# Patient Record
Sex: MALE | Race: WHITE | NOT HISPANIC OR LATINO | URBAN - METROPOLITAN AREA
[De-identification: names, ages, dates, MRNs, and addresses within clinical notes are randomized per-mention and may not be internally consistent; named-entity substitution may affect disease eponyms.]

---
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
## 2024-04-05 ENCOUNTER — OFFICE VISIT (OUTPATIENT)
Age: 19
End: 2024-04-05
Payer: COMMERCIAL

## 2024-04-05 VITALS
OXYGEN SATURATION: 97 % | DIASTOLIC BLOOD PRESSURE: 68 MMHG | SYSTOLIC BLOOD PRESSURE: 118 MMHG | WEIGHT: 137.57 LBS | TEMPERATURE: 97.1 F | HEART RATE: 94 BPM | BODY MASS INDEX: 19.69 KG/M2 | HEIGHT: 70 IN | RESPIRATION RATE: 16 BRPM

## 2024-04-05 DIAGNOSIS — Z20.2 EXPOSURE TO CHLAMYDIA: Primary | ICD-10-CM

## 2024-04-05 PROCEDURE — 87591 N.GONORRHOEAE DNA AMP PROB: CPT | Performed by: EMERGENCY MEDICINE

## 2024-04-05 PROCEDURE — 87491 CHLMYD TRACH DNA AMP PROBE: CPT | Performed by: EMERGENCY MEDICINE

## 2024-04-05 PROCEDURE — 99203 OFFICE O/P NEW LOW 30 MIN: CPT | Performed by: EMERGENCY MEDICINE

## 2024-04-05 NOTE — PROGRESS NOTES
[facility]        NAME: Lebron Deluca is a 18 y.o. male  : 2005    MRN: [id number]  DATE: 2024  TIME: 1:00 PM    Assessment and Plan   Exposure to chlamydia [Z20.2]  1. Exposure to chlamydia  Chlamydia/GC amplified DNA by PCR            Patient Instructions     Patient Instructions   Chlamydia   WHAT YOU NEED TO KNOW:   Chlamydia is a sexually transmitted infection (STI) caused by bacteria. Chlamydia is spread during oral, vaginal, or anal sex. The infection most often affects the urethra, rectum, or throat. The urethra is the tube that carries urine from your bladder to the outside of your body. Anyone with multiple sex partners is at higher risk for chlamydia. Your risk is also increased if you have another STI, such as gonorrhea.  DISCHARGE INSTRUCTIONS:   Call your doctor if:   You have a fever.    You have nausea or you cannot stop vomiting.    You have severe abdominal pain.    Your signs or symptoms last longer than 1 week or get worse during treatment.    Your signs or symptoms return after treatment.    You have pain during sex.    You have questions or concerns about your condition or care.    Medicines:   Antibiotics  help treat the infection caused by bacteria. Both you and your sex partner need treatment to prevent chlamydia from spreading.    Take your medicine as directed.  Contact your healthcare provider if you think your medicine is not helping or if you have side effects. Tell your provider if you are allergic to any medicine. Keep a list of the medicines, vitamins, and herbs you take. Include the amounts, and when and why you take them. Bring the list or the pill bottles to follow-up visits. Carry your medicine list with you in case of an emergency.    How can I prevent the spread of chlamydia and other STIs?  Ask your healthcare provider for more information about the following safe sex practices:  Use a male or female condom during sex.  This includes oral, genital, or  anal sex. Use a new condom each time. Condoms help prevent pregnancy and STIs. Use latex condoms, if possible. Lambskin (also called sheepskin or natural membrane) condoms do not protect against STIs. A polyurethane condom can be used if you or your partner is allergic to latex. Condoms should be used with a second form of birth control to help prevent pregnancy and STIs. Do not use male and female condoms together. Ask for more information about the correct way to use condoms.    Limit your number of sex partners.  This will help lower your risk for chlamydia and other STIs.    Get tested for STIs regularly  if you are sexually active. You should get tested 1 time a year, or after new sexual partners. Get tested if you have sex without a condom. This includes oral, genital, or anal sex.    Do not have sex with someone who has an STI.  This includes oral, vaginal, and anal sex.    Do not have sex while you or your partner are being treated.  Ask when it is safe to have sex.    Ask about medicines to lower your risk for some STIs:      Vaccines  can help protect you from hepatitis A, hepatitis B, and the human papillomavirus (HPV). The HPV vaccine is usually given at 11 years, but it may be given through 26 years to both females and males. Your provider can give you more information on vaccines to prevent STIs.    Pre-exposure prophylaxis (PrEP)  may be given if you are at high risk for HIV. PrEP is taken every day to prevent the virus from fully infecting the body.    If you are a woman:      Do not douche.  Douching upsets the normal balance of bacteria found in your vagina. It does not prevent or clear up vaginal infections.    Tell your healthcare provider if you are pregnant.  Gonorrhea can be passed to an infant during birth.    Follow up with your doctor as directed:  Write down your questions so you remember to ask them during your visits.  © Copyright [employer] 2023 Information is for End User's use only and  "may not be sold, redistributed or otherwise used for commercial purposes.  The above information is an  only. It is not intended as medical advice for individual conditions or treatments. Talk to your doctor, nurse or pharmacist before following any medical regimen to see if it is safe and effective for you.      Follow up with PCP in 3-5 days.  Proceed to  ER if symptoms worsen.    Chief Complaint     Chief Complaint   Patient presents with    Exposure to STD     Exposure to chlamydia. about one week ago. Denies any STD symptoms.          History of Present Illness       Patient requests Chlamydia testing after exposure during protected sex to someone just diagnosed with chlamydia.  Patient denies any urethral symptoms.        Review of Systems   Review of Systems   Constitutional:  Negative for chills and fever.   Genitourinary:  Negative for difficulty urinating, dysuria, flank pain, hematuria, penile discharge, scrotal swelling and testicular pain.   Musculoskeletal:  Negative for back pain.   Skin:  Negative for rash.         Current Medications     No current outpatient medications on file.    Current Allergies     Allergies as of 04/05/2024    (No Known Allergies)            The following portions of the patient's history were reviewed and updated as appropriate: allergies, current medications, past family history, past medical history, past social history, past surgical history and problem list.     Past Medical History:   Diagnosis Date    Migraines        History reviewed. No pertinent surgical history.    Family History   Problem Relation Age of Onset    No Known Problems Mother     No Known Problems Father          Medications have been verified.        Objective   /68   Pulse 94   Temp (!) 97.1 °F (36.2 °C)   Resp 16   Ht 5' 10\" (1.778 m)   Wt 62.4 kg (137 lb 9.1 oz)   SpO2 97%   BMI 19.74 kg/m²        Physical Exam     Physical Exam  Vitals and nursing note reviewed. "   Constitutional:       General: He is not in acute distress.     Appearance: He is well-developed. He is not diaphoretic.   Skin:     General: Skin is warm and dry.      Coloration: Skin is not pale.   Neurological:      Mental Status: He is alert and oriented to person, place, and time.

## 2024-04-05 NOTE — PATIENT INSTRUCTIONS
Chlamydia   WHAT YOU NEED TO KNOW:   Chlamydia is a sexually transmitted infection (STI) caused by bacteria. Chlamydia is spread during oral, vaginal, or anal sex. The infection most often affects the urethra, rectum, or throat. The urethra is the tube that carries urine from your bladder to the outside of your body. Anyone with multiple sex partners is at higher risk for chlamydia. Your risk is also increased if you have another STI, such as gonorrhea.  DISCHARGE INSTRUCTIONS:   Call your doctor if:   You have a fever.    You have nausea or you cannot stop vomiting.    You have severe abdominal pain.    Your signs or symptoms last longer than 1 week or get worse during treatment.    Your signs or symptoms return after treatment.    You have pain during sex.    You have questions or concerns about your condition or care.    Medicines:   Antibiotics  help treat the infection caused by bacteria. Both you and your sex partner need treatment to prevent chlamydia from spreading.    Take your medicine as directed.  Contact your healthcare provider if you think your medicine is not helping or if you have side effects. Tell your provider if you are allergic to any medicine. Keep a list of the medicines, vitamins, and herbs you take. Include the amounts, and when and why you take them. Bring the list or the pill bottles to follow-up visits. Carry your medicine list with you in case of an emergency.    How can I prevent the spread of chlamydia and other STIs?  Ask your healthcare provider for more information about the following safe sex practices:  Use a male or female condom during sex.  This includes oral, genital, or anal sex. Use a new condom each time. Condoms help prevent pregnancy and STIs. Use latex condoms, if possible. Lambskin (also called sheepskin or natural membrane) condoms do not protect against STIs. A polyurethane condom can be used if you or your partner is allergic to latex. Condoms should be used with a  second form of birth control to help prevent pregnancy and STIs. Do not use male and female condoms together. Ask for more information about the correct way to use condoms.    Limit your number of sex partners.  This will help lower your risk for chlamydia and other STIs.    Get tested for STIs regularly  if you are sexually active. You should get tested 1 time a year, or after new sexual partners. Get tested if you have sex without a condom. This includes oral, genital, or anal sex.    Do not have sex with someone who has an STI.  This includes oral, vaginal, and anal sex.    Do not have sex while you or your partner are being treated.  Ask when it is safe to have sex.    Ask about medicines to lower your risk for some STIs:      Vaccines  can help protect you from hepatitis A, hepatitis B, and the human papillomavirus (HPV). The HPV vaccine is usually given at 11 years, but it may be given through 26 years to both females and males. Your provider can give you more information on vaccines to prevent STIs.    Pre-exposure prophylaxis (PrEP)  may be given if you are at high risk for HIV. PrEP is taken every day to prevent the virus from fully infecting the body.    If you are a woman:      Do not douche.  Douching upsets the normal balance of bacteria found in your vagina. It does not prevent or clear up vaginal infections.    Tell your healthcare provider if you are pregnant.  Gonorrhea can be passed to an infant during birth.    Follow up with your doctor as directed:  Write down your questions so you remember to ask them during your visits.  © Copyright [employer] 2023 Information is for End User's use only and may not be sold, redistributed or otherwise used for commercial purposes.  The above information is an  only. It is not intended as medical advice for individual conditions or treatments. Talk to your doctor, nurse or pharmacist before following any medical regimen to see if it is safe and  effective for you.

## 2024-04-06 LAB
C TRACH DNA SPEC QL NAA+PROBE: NEGATIVE
N GONORRHOEA DNA SPEC QL NAA+PROBE: NEGATIVE